# Patient Record
Sex: FEMALE | Race: WHITE | URBAN - METROPOLITAN AREA
[De-identification: names, ages, dates, MRNs, and addresses within clinical notes are randomized per-mention and may not be internally consistent; named-entity substitution may affect disease eponyms.]

---

## 2019-07-17 PROCEDURE — 86850 RBC ANTIBODY SCREEN: CPT | Performed by: OBSTETRICS & GYNECOLOGY

## 2019-07-17 PROCEDURE — 87624 HPV HI-RISK TYP POOLED RSLT: CPT | Performed by: OBSTETRICS & GYNECOLOGY

## 2019-07-17 PROCEDURE — 87389 HIV-1 AG W/HIV-1&-2 AB AG IA: CPT | Performed by: OBSTETRICS & GYNECOLOGY

## 2019-07-17 PROCEDURE — 87086 URINE CULTURE/COLONY COUNT: CPT | Performed by: OBSTETRICS & GYNECOLOGY

## 2019-07-17 PROCEDURE — 86901 BLOOD TYPING SEROLOGIC RH(D): CPT | Performed by: OBSTETRICS & GYNECOLOGY

## 2019-07-17 PROCEDURE — 86900 BLOOD TYPING SEROLOGIC ABO: CPT | Performed by: OBSTETRICS & GYNECOLOGY

## 2019-07-17 PROCEDURE — 88175 CYTOPATH C/V AUTO FLUID REDO: CPT | Performed by: OBSTETRICS & GYNECOLOGY

## 2019-07-24 PROCEDURE — 83021 HEMOGLOBIN CHROMOTOGRAPHY: CPT | Performed by: OBSTETRICS & GYNECOLOGY

## 2019-07-31 PROBLEM — O88.211 PULMONARY EMBOLISM AFFECTING PREGNANCY IN FIRST TRIMESTER: Status: ACTIVE | Noted: 2019-07-31

## 2019-07-31 PROBLEM — O88.211: Status: ACTIVE | Noted: 2019-07-31

## 2019-08-11 NOTE — PROGRESS NOTES
Outpatient Maternal-Fetal Medicine Consultation    Dear Dr. Tiffanie Lopez    Thank you for requesting a first trimester ultrasound and MFM consultation on your patient Mukesh Hyatt.   As you are aware she is a 33year old female  with a singletonpregnan patient. DISCUSSION  During her visit we discussed and reviewed the following issues:  FIRST TRIMESTER SCREENING:      The testing process was reviewed with the patient.   This screening test utilizes maternal age, nuchal translucency, ANUJA-A, and free b Trimester Screening: normal NT  · Bilateral PE's In Pregnancy - seen by hematology, on enoxaparin    RECOMMENDATIONS:  · Continue care with Dr. Jass Burroughs   · Continue comanagement with hematology  · Continue enoxaparin per hematology (70 mg SQ q 12 hours)  ·

## 2019-08-12 ENCOUNTER — HOSPITAL ENCOUNTER (OUTPATIENT)
Dept: PERINATAL CARE | Facility: HOSPITAL | Age: 29
Discharge: HOME | End: 2019-08-12
Attending: OBSTETRICS & GYNECOLOGY
Payer: COMMERCIAL

## 2019-08-12 VITALS
WEIGHT: 154 LBS | HEART RATE: 103 BPM | SYSTOLIC BLOOD PRESSURE: 140 MMHG | DIASTOLIC BLOOD PRESSURE: 85 MMHG | HEIGHT: 64 IN | BODY MASS INDEX: 26.29 KG/M2

## 2019-08-12 DIAGNOSIS — O88.211 PULMONARY EMBOLISM AFFECTING PREGNANCY IN FIRST TRIMESTER: ICD-10-CM

## 2019-08-12 DIAGNOSIS — Z36.9 FIRST TRIMESTER SCREENING: ICD-10-CM

## 2019-08-12 DIAGNOSIS — Z36.9 FIRST TRIMESTER SCREENING: Primary | ICD-10-CM

## 2019-08-12 PROCEDURE — 99242 OFF/OP CONSLTJ NEW/EST SF 20: CPT | Performed by: OBSTETRICS & GYNECOLOGY

## 2019-08-12 PROCEDURE — 76813 OB US NUCHAL MEAS 1 GEST: CPT | Performed by: OBSTETRICS & GYNECOLOGY

## 2019-08-12 RX ORDER — ENOXAPARIN SODIUM 150 MG/ML
70 INJECTION SUBCUTANEOUS EVERY 12 HOURS
COMMUNITY
End: 2019-08-13

## 2019-08-12 NOTE — PROGRESS NOTES
HPI:    Patient ID: Beau Sanchez is a 33year old female. HPI    Review of Systems         Current Outpatient Medications:  Enoxaparin Sodium 150 MG/ML Subcutaneous Solution Inject 70 mg into the skin every 12 (twelve) hours.  Disp:  Rfl:    Ferrous S

## 2019-08-13 PROBLEM — J30.9 ALLERGIC RHINITIS, UNSPECIFIED SEASONALITY, UNSPECIFIED TRIGGER: Status: ACTIVE | Noted: 2019-08-13

## 2019-08-13 PROBLEM — D50.9 IRON DEFICIENCY ANEMIA: Status: ACTIVE | Noted: 2019-01-01

## 2019-08-13 PROCEDURE — 86480 TB TEST CELL IMMUN MEASURE: CPT | Performed by: INTERNAL MEDICINE

## 2019-08-15 ENCOUNTER — TELEPHONE (OUTPATIENT)
Dept: PERINATAL CARE | Facility: HOSPITAL | Age: 29
End: 2019-08-15

## 2019-08-15 NOTE — TELEPHONE ENCOUNTER
Recd FTS results and Dr Nino Escobedo reviewed and signed off  Left message for Aden Holstein to inform her that the risk after screening  for Trisomy 13,18 and 21 is  1 in > 10,000 . These results are  within range  Pt left number to call back with any questions. Stepan Batres

## 2019-09-19 PROCEDURE — 82105 ALPHA-FETOPROTEIN SERUM: CPT | Performed by: OBSTETRICS & GYNECOLOGY

## 2019-09-19 PROCEDURE — 36415 COLL VENOUS BLD VENIPUNCTURE: CPT | Performed by: OBSTETRICS & GYNECOLOGY

## 2019-10-04 ENCOUNTER — TELEPHONE (OUTPATIENT)
Dept: PERINATAL CARE | Facility: HOSPITAL | Age: 29
End: 2019-10-04

## 2019-10-13 NOTE — PROGRESS NOTES
Gregg Kennedy    Dear Dr. Lambert Heads    Thank you for requesting an ultrasound and maternal-fetal medicine consultation on your patient Bradley Limon.   As you are aware she is a 26year old female  with a garibay preg 21w1d  · Bilateral PE's In Pregnancy - seen by hematology, on enoxaparin     RECOMMENDATIONS:  · Continue care with Dr. Gretchen Kendall ultrasound at 32 weeks.   · Continue comanagement with hematology  · Continue enoxaparin per hematology (70 mg

## 2019-10-14 ENCOUNTER — HOSPITAL ENCOUNTER (OUTPATIENT)
Dept: PERINATAL CARE | Facility: HOSPITAL | Age: 29
Discharge: HOME | End: 2019-10-14
Attending: OBSTETRICS & GYNECOLOGY
Payer: COMMERCIAL

## 2019-10-14 VITALS
SYSTOLIC BLOOD PRESSURE: 132 MMHG | HEART RATE: 97 BPM | BODY MASS INDEX: 28 KG/M2 | WEIGHT: 165 LBS | DIASTOLIC BLOOD PRESSURE: 71 MMHG

## 2019-10-14 DIAGNOSIS — O88.211 PULMONARY EMBOLISM AFFECTING PREGNANCY IN FIRST TRIMESTER: ICD-10-CM

## 2019-10-14 DIAGNOSIS — O88.211 PULMONARY EMBOLISM AFFECTING PREGNANCY IN FIRST TRIMESTER: Primary | ICD-10-CM

## 2019-10-14 PROCEDURE — 76811 OB US DETAILED SNGL FETUS: CPT | Performed by: OBSTETRICS & GYNECOLOGY

## 2019-10-14 PROCEDURE — 99215 OFFICE O/P EST HI 40 MIN: CPT | Performed by: OBSTETRICS & GYNECOLOGY

## 2019-10-15 ENCOUNTER — TELEPHONE (OUTPATIENT)
Dept: PERINATAL CARE | Facility: HOSPITAL | Age: 29
End: 2019-10-15

## 2019-11-25 PROBLEM — D56.3 ALPHA THALASSEMIA SILENT CARRIER: Status: ACTIVE | Noted: 2019-01-01

## 2019-12-29 NOTE — PROGRESS NOTES
Bradley Wyman  Dear Dr. Lorena Crook     Thank you for requesting an ultrasound and maternal-fetal medicine consultation on your patient Anitra Arteaga.   As you are aware she is a 26year old female  with a garibay pr about potential side effects of heparin therapy such as osteopenia and heparin induced thrombocytopenia (HIT).       IMPRESSION:  · IUP at 32w1d  · Bilateral PE's In Pregnancy - seen by hematology, on enoxaparin     RECOMMENDATIONS:  · Continue care with

## 2019-12-30 ENCOUNTER — HOSPITAL ENCOUNTER (OUTPATIENT)
Dept: PERINATAL CARE | Facility: HOSPITAL | Age: 29
Discharge: HOME | End: 2019-12-30
Attending: OBSTETRICS & GYNECOLOGY
Payer: COMMERCIAL

## 2019-12-30 VITALS
WEIGHT: 184 LBS | SYSTOLIC BLOOD PRESSURE: 129 MMHG | BODY MASS INDEX: 31.41 KG/M2 | DIASTOLIC BLOOD PRESSURE: 82 MMHG | HEIGHT: 64 IN | HEART RATE: 114 BPM

## 2019-12-30 DIAGNOSIS — O88.211 PULMONARY EMBOLISM AFFECTING PREGNANCY IN FIRST TRIMESTER: Primary | ICD-10-CM

## 2019-12-30 DIAGNOSIS — O88.211 PULMONARY EMBOLISM AFFECTING PREGNANCY IN FIRST TRIMESTER: ICD-10-CM

## 2019-12-30 PROCEDURE — 99213 OFFICE O/P EST LOW 20 MIN: CPT | Performed by: OBSTETRICS & GYNECOLOGY

## 2019-12-30 PROCEDURE — 76816 OB US FOLLOW-UP PER FETUS: CPT | Performed by: OBSTETRICS & GYNECOLOGY

## 2019-12-30 PROCEDURE — 76819 FETAL BIOPHYS PROFIL W/O NST: CPT | Performed by: OBSTETRICS & GYNECOLOGY

## 2020-02-10 PROBLEM — D56.3 ALPHA THALASSEMIA SILENT CARRIER: Status: RESOLVED | Noted: 2019-01-01 | Resolved: 2020-02-05

## 2020-11-20 PROBLEM — D50.9 IRON DEFICIENCY ANEMIA, UNSPECIFIED IRON DEFICIENCY ANEMIA TYPE: Status: ACTIVE | Noted: 2020-11-20

## 2020-11-20 PROBLEM — D68.51 HETEROZYGOUS FACTOR V LEIDEN MUTATION (HCC): Status: ACTIVE | Noted: 2020-11-20

## 2021-07-24 PROBLEM — N94.6 DYSMENORRHEA: Status: ACTIVE | Noted: 2021-07-24

## 2021-07-24 PROBLEM — Z00.00 WELLNESS EXAMINATION: Status: ACTIVE | Noted: 2021-07-24

## 2021-07-24 PROBLEM — M25.462 PAIN AND SWELLING OF LEFT KNEE: Status: ACTIVE | Noted: 2021-07-24

## 2021-07-24 PROBLEM — Z80.3 FAMILY HISTORY OF BREAST CANCER IN MOTHER: Status: ACTIVE | Noted: 2021-07-24

## 2021-07-24 PROBLEM — M54.2 NECK PAIN: Status: ACTIVE | Noted: 2021-07-24

## 2021-07-24 PROBLEM — M25.562 PAIN AND SWELLING OF LEFT KNEE: Status: ACTIVE | Noted: 2021-07-24

## 2021-07-24 PROBLEM — D56.0 ALPHA-THALASSEMIA (HCC): Status: ACTIVE | Noted: 2021-07-24

## 2024-01-17 LAB
AMB EXT TREPONEMAL ANTIBODIES: NEGATIVE
ANTIBODY SCREEN OB: NEGATIVE
HEPATITIS B SURFACE ANTIGEN OB: NEGATIVE
HIV RESULT OB: NEGATIVE

## 2024-04-30 LAB
AMB EXT TREPONEMAL ANTIBODIES: NEGATIVE
HIV RESULT OB: NEGATIVE

## 2024-06-28 LAB — STREP GP B CULT OB: NEGATIVE

## 2024-07-11 ENCOUNTER — TELEPHONE (OUTPATIENT)
Dept: OBGYN UNIT | Facility: HOSPITAL | Age: 34
End: 2024-07-11

## 2024-07-16 ENCOUNTER — ANESTHESIA EVENT (OUTPATIENT)
Dept: OBGYN UNIT | Facility: HOSPITAL | Age: 34
End: 2024-07-16
Payer: COMMERCIAL

## 2024-07-16 ENCOUNTER — HOSPITAL ENCOUNTER (INPATIENT)
Facility: HOSPITAL | Age: 34
LOS: 1 days | Discharge: HOME | End: 2024-07-17
Attending: OBSTETRICS & GYNECOLOGY | Admitting: OBSTETRICS & GYNECOLOGY
Payer: COMMERCIAL

## 2024-07-16 ENCOUNTER — ANESTHESIA (OUTPATIENT)
Dept: OBGYN UNIT | Facility: HOSPITAL | Age: 34
End: 2024-07-16
Payer: COMMERCIAL

## 2024-07-16 ENCOUNTER — APPOINTMENT (OUTPATIENT)
Dept: OBGYN CLINIC | Facility: HOSPITAL | Age: 34
End: 2024-07-16
Attending: OBSTETRICS & GYNECOLOGY
Payer: COMMERCIAL

## 2024-07-16 PROBLEM — Z34.90 PREGNANCY (HCC): Status: ACTIVE | Noted: 2024-07-16

## 2024-07-16 LAB
ANTIBODY SCREEN: NEGATIVE
BASOPHILS # BLD AUTO: 0.08 X10(3) UL (ref 0–0.2)
BASOPHILS NFR BLD AUTO: 0.8 %
DEPRECATED RDW RBC AUTO: 34.9 FL (ref 35.1–46.3)
EOSINOPHIL # BLD AUTO: 0.16 X10(3) UL (ref 0–0.7)
EOSINOPHIL NFR BLD AUTO: 1.6 %
ERYTHROCYTE [DISTWIDTH] IN BLOOD BY AUTOMATED COUNT: 15.2 % (ref 11–15)
HCT VFR BLD AUTO: 37.3 %
HGB BLD-MCNC: 12 G/DL
IMM GRANULOCYTES # BLD AUTO: 0.25 X10(3) UL (ref 0–1)
IMM GRANULOCYTES NFR BLD: 2.5 %
LYMPHOCYTES # BLD AUTO: 2.82 X10(3) UL (ref 1–4)
LYMPHOCYTES NFR BLD AUTO: 28.3 %
MCH RBC QN AUTO: 21.5 PG (ref 26–34)
MCHC RBC AUTO-ENTMCNC: 32.2 G/DL (ref 31–37)
MCV RBC AUTO: 66.8 FL
MONOCYTES # BLD AUTO: 0.8 X10(3) UL (ref 0.1–1)
MONOCYTES NFR BLD AUTO: 8 %
NEUTROPHILS # BLD AUTO: 5.86 X10 (3) UL (ref 1.5–7.7)
NEUTROPHILS # BLD AUTO: 5.86 X10(3) UL (ref 1.5–7.7)
NEUTROPHILS NFR BLD AUTO: 58.8 %
PLATELET # BLD AUTO: 159 10(3)UL (ref 150–450)
PLATELETS.RETICULATED NFR BLD AUTO: 7.1 % (ref 0–7)
RBC # BLD AUTO: 5.58 X10(6)UL
RH BLOOD TYPE: POSITIVE
WBC # BLD AUTO: 10 X10(3) UL (ref 4–11)

## 2024-07-16 PROCEDURE — 86850 RBC ANTIBODY SCREEN: CPT | Performed by: OBSTETRICS & GYNECOLOGY

## 2024-07-16 PROCEDURE — 85025 COMPLETE CBC W/AUTO DIFF WBC: CPT | Performed by: OBSTETRICS & GYNECOLOGY

## 2024-07-16 PROCEDURE — 3E033VJ INTRODUCTION OF OTHER HORMONE INTO PERIPHERAL VEIN, PERCUTANEOUS APPROACH: ICD-10-PCS | Performed by: OBSTETRICS & GYNECOLOGY

## 2024-07-16 PROCEDURE — 86900 BLOOD TYPING SEROLOGIC ABO: CPT | Performed by: OBSTETRICS & GYNECOLOGY

## 2024-07-16 PROCEDURE — 86901 BLOOD TYPING SEROLOGIC RH(D): CPT | Performed by: OBSTETRICS & GYNECOLOGY

## 2024-07-16 PROCEDURE — 85060 BLOOD SMEAR INTERPRETATION: CPT | Performed by: OBSTETRICS & GYNECOLOGY

## 2024-07-16 PROCEDURE — 86780 TREPONEMA PALLIDUM: CPT | Performed by: OBSTETRICS & GYNECOLOGY

## 2024-07-16 PROCEDURE — 0KQM0ZZ REPAIR PERINEUM MUSCLE, OPEN APPROACH: ICD-10-PCS | Performed by: OBSTETRICS & GYNECOLOGY

## 2024-07-16 RX ORDER — CHOLECALCIFEROL (VITAMIN D3) 25 MCG
1 TABLET,CHEWABLE ORAL DAILY
Status: DISCONTINUED | OUTPATIENT
Start: 2024-07-16 | End: 2024-07-17

## 2024-07-16 RX ORDER — ENOXAPARIN SODIUM 100 MG/ML
40 INJECTION SUBCUTANEOUS DAILY
Status: DISCONTINUED | OUTPATIENT
Start: 2024-07-17 | End: 2024-07-17

## 2024-07-16 RX ORDER — FERROUS SULFATE 325(65) MG
325 TABLET, DELAYED RELEASE (ENTERIC COATED) ORAL
COMMUNITY

## 2024-07-16 RX ORDER — ACETAMINOPHEN 500 MG
1000 TABLET ORAL EVERY 6 HOURS PRN
Status: DISCONTINUED | OUTPATIENT
Start: 2024-07-16 | End: 2024-07-17

## 2024-07-16 RX ORDER — SIMETHICONE 80 MG
80 TABLET,CHEWABLE ORAL 3 TIMES DAILY PRN
Status: DISCONTINUED | OUTPATIENT
Start: 2024-07-16 | End: 2024-07-17

## 2024-07-16 RX ORDER — LIDOCAINE HCL/EPINEPHRINE/PF 2%-1:200K
VIAL (ML) INJECTION
Status: DISCONTINUED
Start: 2024-07-16 | End: 2024-07-16 | Stop reason: WASHOUT

## 2024-07-16 RX ORDER — BENZOCAINE/MENTHOL 6 MG-10 MG
1 LOZENGE MUCOUS MEMBRANE EVERY 6 HOURS PRN
Status: DISCONTINUED | OUTPATIENT
Start: 2024-07-16 | End: 2024-07-17

## 2024-07-16 RX ORDER — LIDOCAINE HYDROCHLORIDE 10 MG/ML
30 INJECTION, SOLUTION EPIDURAL; INFILTRATION; INTRACAUDAL; PERINEURAL ONCE
Status: DISCONTINUED | OUTPATIENT
Start: 2024-07-16 | End: 2024-07-16 | Stop reason: HOSPADM

## 2024-07-16 RX ORDER — ONDANSETRON 2 MG/ML
4 INJECTION INTRAMUSCULAR; INTRAVENOUS EVERY 6 HOURS PRN
Status: DISCONTINUED | OUTPATIENT
Start: 2024-07-16 | End: 2024-07-17

## 2024-07-16 RX ORDER — DOCUSATE SODIUM 100 MG/1
100 CAPSULE, LIQUID FILLED ORAL
Status: DISCONTINUED | OUTPATIENT
Start: 2024-07-16 | End: 2024-07-17

## 2024-07-16 RX ORDER — ZOLPIDEM TARTRATE 5 MG/1
5 TABLET ORAL NIGHTLY PRN
Status: DISCONTINUED | OUTPATIENT
Start: 2024-07-16 | End: 2024-07-16 | Stop reason: HOSPADM

## 2024-07-16 RX ORDER — CITRIC ACID/SODIUM CITRATE 334-500MG
30 SOLUTION, ORAL ORAL AS NEEDED
Status: DISCONTINUED | OUTPATIENT
Start: 2024-07-16 | End: 2024-07-16 | Stop reason: HOSPADM

## 2024-07-16 RX ORDER — IBUPROFEN 600 MG/1
600 TABLET ORAL ONCE AS NEEDED
Status: DISCONTINUED | OUTPATIENT
Start: 2024-07-16 | End: 2024-07-16 | Stop reason: HOSPADM

## 2024-07-16 RX ORDER — ONDANSETRON 2 MG/ML
4 INJECTION INTRAMUSCULAR; INTRAVENOUS EVERY 6 HOURS PRN
Status: DISCONTINUED | OUTPATIENT
Start: 2024-07-16 | End: 2024-07-16 | Stop reason: HOSPADM

## 2024-07-16 RX ORDER — SODIUM CHLORIDE, SODIUM LACTATE, POTASSIUM CHLORIDE, CALCIUM CHLORIDE 600; 310; 30; 20 MG/100ML; MG/100ML; MG/100ML; MG/100ML
INJECTION, SOLUTION INTRAVENOUS AS NEEDED
Status: DISCONTINUED | OUTPATIENT
Start: 2024-07-16 | End: 2024-07-16 | Stop reason: HOSPADM

## 2024-07-16 RX ORDER — ACETAMINOPHEN 500 MG
1000 TABLET ORAL EVERY 6 HOURS PRN
Status: DISCONTINUED | OUTPATIENT
Start: 2024-07-16 | End: 2024-07-16 | Stop reason: HOSPADM

## 2024-07-16 RX ORDER — ENOXAPARIN SODIUM 100 MG/ML
40 INJECTION SUBCUTANEOUS DAILY
Status: CANCELLED | OUTPATIENT
Start: 2024-07-16

## 2024-07-16 RX ORDER — NALBUPHINE HYDROCHLORIDE 10 MG/ML
10 INJECTION, SOLUTION INTRAMUSCULAR; INTRAVENOUS; SUBCUTANEOUS EVERY 4 HOURS PRN
Status: DISCONTINUED | OUTPATIENT
Start: 2024-07-16 | End: 2024-07-16 | Stop reason: HOSPADM

## 2024-07-16 RX ORDER — DEXTROSE, SODIUM CHLORIDE, SODIUM LACTATE, POTASSIUM CHLORIDE, AND CALCIUM CHLORIDE 5; .6; .31; .03; .02 G/100ML; G/100ML; G/100ML; G/100ML; G/100ML
INJECTION, SOLUTION INTRAVENOUS CONTINUOUS
Status: DISCONTINUED | OUTPATIENT
Start: 2024-07-16 | End: 2024-07-16 | Stop reason: HOSPADM

## 2024-07-16 RX ORDER — BUPIVACAINE HYDROCHLORIDE 2.5 MG/ML
20 INJECTION, SOLUTION EPIDURAL; INFILTRATION; INTRACAUDAL ONCE
Status: DISCONTINUED | OUTPATIENT
Start: 2024-07-16 | End: 2024-07-16 | Stop reason: HOSPADM

## 2024-07-16 RX ORDER — BISACODYL 10 MG
10 SUPPOSITORY, RECTAL RECTAL ONCE AS NEEDED
Status: DISCONTINUED | OUTPATIENT
Start: 2024-07-16 | End: 2024-07-17

## 2024-07-16 RX ORDER — HYDROXYZINE HYDROCHLORIDE 50 MG/ML
50 INJECTION, SOLUTION INTRAMUSCULAR EVERY 4 HOURS PRN
Status: DISCONTINUED | OUTPATIENT
Start: 2024-07-16 | End: 2024-07-16 | Stop reason: HOSPADM

## 2024-07-16 RX ORDER — TERBUTALINE SULFATE 1 MG/ML
0.25 INJECTION, SOLUTION SUBCUTANEOUS AS NEEDED
Status: DISCONTINUED | OUTPATIENT
Start: 2024-07-16 | End: 2024-07-16 | Stop reason: HOSPADM

## 2024-07-16 RX ORDER — HEPARIN SODIUM 5000 [USP'U]/ML
5000 INJECTION, SOLUTION INTRAVENOUS; SUBCUTANEOUS EVERY 8 HOURS SCHEDULED
COMMUNITY
End: 2024-07-17

## 2024-07-16 RX ORDER — AMMONIA INHALANTS 0.04 G/.3ML
0.3 INHALANT RESPIRATORY (INHALATION) AS NEEDED
Status: DISCONTINUED | OUTPATIENT
Start: 2024-07-16 | End: 2024-07-17

## 2024-07-16 RX ORDER — BUPIVACAINE HYDROCHLORIDE 2.5 MG/ML
INJECTION, SOLUTION EPIDURAL; INFILTRATION; INTRACAUDAL
Status: COMPLETED | OUTPATIENT
Start: 2024-07-16 | End: 2024-07-16

## 2024-07-16 RX ORDER — ACETAMINOPHEN 500 MG
500 TABLET ORAL EVERY 6 HOURS PRN
Status: DISCONTINUED | OUTPATIENT
Start: 2024-07-16 | End: 2024-07-16 | Stop reason: HOSPADM

## 2024-07-16 RX ORDER — LIDOCAINE HYDROCHLORIDE AND EPINEPHRINE 15; 5 MG/ML; UG/ML
INJECTION, SOLUTION EPIDURAL
Status: COMPLETED | OUTPATIENT
Start: 2024-07-16 | End: 2024-07-16

## 2024-07-16 RX ORDER — ENOXAPARIN SODIUM 100 MG/ML
40 INJECTION SUBCUTANEOUS DAILY
Status: DISCONTINUED | OUTPATIENT
Start: 2024-07-17 | End: 2024-07-16

## 2024-07-16 RX ORDER — NALBUPHINE HYDROCHLORIDE 10 MG/ML
2.5 INJECTION, SOLUTION INTRAMUSCULAR; INTRAVENOUS; SUBCUTANEOUS
Status: DISCONTINUED | OUTPATIENT
Start: 2024-07-16 | End: 2024-07-17

## 2024-07-16 RX ORDER — CALCIUM CARBONATE 500 MG/1
1000 TABLET, CHEWABLE ORAL EVERY 4 HOURS PRN
Status: DISCONTINUED | OUTPATIENT
Start: 2024-07-16 | End: 2024-07-16 | Stop reason: HOSPADM

## 2024-07-16 RX ORDER — ACETAMINOPHEN 500 MG
500 TABLET ORAL EVERY 6 HOURS PRN
Status: DISCONTINUED | OUTPATIENT
Start: 2024-07-16 | End: 2024-07-17

## 2024-07-16 RX ORDER — BUPIVACAINE HCL/0.9 % NACL/PF 0.25 %
5 PLASTIC BAG, INJECTION (ML) EPIDURAL AS NEEDED
Status: DISCONTINUED | OUTPATIENT
Start: 2024-07-16 | End: 2024-07-17

## 2024-07-16 RX ADMIN — LIDOCAINE HYDROCHLORIDE AND EPINEPHRINE 5 ML: 15; 5 INJECTION, SOLUTION EPIDURAL at 11:30:00

## 2024-07-16 RX ADMIN — BUPIVACAINE HYDROCHLORIDE 10 ML: 2.5 INJECTION, SOLUTION EPIDURAL; INFILTRATION; INTRACAUDAL at 11:30:00

## 2024-07-16 NOTE — ANESTHESIA POSTPROCEDURE EVALUATION
Patient: Edelmira Richardson    Procedure Summary       Date: 07/16/24 Room / Location:     Anesthesia Start: 1129 Anesthesia Stop: 1359    Procedure: LABOR ANALGESIA Diagnosis:     Scheduled Providers:  Anesthesiologist: Aidee Garcia MD    Anesthesia Type: epidural ASA Status: 2            Anesthesia Type: epidural    Vitals Value Taken Time   /63 07/16/24 1446   Temp 98 07/16/24 1608   Pulse 91 07/16/24 1446   Resp 12 07/16/24 1608   SpO2 100 % 07/16/24 1415       [facility] AN Post Evaluation:   Patient Evaluated in PACU  Patient Participation: complete - patient participated  Level of Consciousness: awake  Pain Management: adequate  Airway Patency:patent  Dental exam unchanged from preop  Yes    Cardiovascular Status: acceptable  Respiratory Status: acceptable  Postoperative Hydration acceptable      AIDEE GARCIA MD  7/16/2024 4:08 PM

## 2024-07-16 NOTE — H&P
[facility]  part of [facility]    History & Physical    Edelmira Richardson Patient Status:  Inpatient    1990 MRN [id number]   Location [facility] B[facility] Attending Rocio Phillpis MD   Hosp Day # 0 PCP Luis Hardy MD     Date of Admission:  2024    SUBJECTIVE:    Edelmira Richardson is a 33 year old  female with 2024, by Last Menstrual Period   at 39w0d gestation who is being admitted for induction of labor.  Her current obstetrical history is significant for  heterozygous factor V leiden and hx pulmonary embolus .  Patient reports no complaints and good fetal movement .   Fetal Movement: normal.     Obstetric History:   OB History    Para Term  AB Living   2 1 1 0 0 1   SAB IAB Ectopic Multiple Live Births   0 0 0 0 1      # Outcome Date GA Lbr Ben/2nd Weight Sex Type Anes PTL Lv   2 Current            1 Term 20 37w3d   M NORMAL SPONT Local N HUNTER      Birth Comments: EBL 300ml     Past Medical History:   Past Medical History:    Allergic rhinitis    Alpha thalassemia silent carrier    worse w/ pregnancy    Iron deficiency anemia    worse w/ pregnancy    Pulmonary emboli (HCC)    Dx early in pregnancy     Past Social History: No past surgical history on file.  Family History:   Family History   Problem Relation Age of Onset    Diabetes Mother     Hypertension Mother     DVT/VTE Mother     Breast Cancer Mother 35        dx 35    Diabetes Father     Hypertension Father      Social History:   Social History     Tobacco Use    Smoking status: Never    Smokeless tobacco: Never   Substance Use Topics    Alcohol use: Never       Home Meds:   Medications Prior to Admission   Medication Sig Dispense Refill Last Dose    ferrous sulfate 325 (65 FE) MG Oral Tab EC Take 1 tablet (325 mg total) by mouth daily with breakfast.   Past Week    heparin 5000 UNIT/ML Injection Solution Inject 1 mL (5,000 Units total) into the skin every 8 (eight)  hours.       Prenatal Multivit-Min-Fe-FA (PRENATAL VITAMINS OR) Take 1 tablet by mouth daily.    7/16/2024    enoxaparin (LOVENOX) 40 MG/0.4ML Subcutaneous Solution Inject 0.4 mL (40 mg total) into the skin daily. 14 each 0 More than a month    Fluocinolone Acetonide (DERMOTIC) 0.01 % Otic Oil 1-2 drops both ears BID PRN itchiness 1 each 2      Allergies:   Allergies   Allergen Reactions    Sulfa Antibiotics ANAPHYLAXIS       OBJECTIVE:    Temp:  [97.9 °F (36.6 °C)] 97.9 °F (36.6 °C)  Pulse:  [92] 92  Resp:  [20] 20  BP: (131)/(87) 131/87    Lungs:   clear to auscultation bilaterally   Heart:   regular rate and rhythm, S1, S2 normal, no murmur, click, rub or gallop   Abdomen: FHT present   Fetal Surveillance:  150 BPM   Fetal heart variability: moderate  Fetal Heart Rate decelerations: none      Cervix: 3-4/50/-4     Lab Review:  A, Rh+, Rubella-immune, Hepatitis B surface antigen non-reactive, GBS negative  Lab Results   Component Value Date    WBC 10.0 07/16/2024    HGB 12.0 07/16/2024    HCT 37.3 07/16/2024    .0 07/16/2024          ASSESSMENT:    39w0d weeks gestation.  Not in labor.  Obstetrical history significant for  hx PE and factor V leiden .    PLAN:    Risks, benefits, alternatives and possible complications have been discussed in detail with the patient.  Pre-admission, admission, and post admission procedures and expectations were discussed in detail.  All questions answered, all appropriate consents will be signed at the Hospital. Admission is planned for today.   Anticipate vaginal delivery. and Augmentation: ARBOW and IV Pitocin induction..    Laney Mendieta MD  7/16/2024  7:16 AM

## 2024-07-16 NOTE — PLAN OF CARE
Problem: POSTPARTUM  Goal: Long Term Goal:Experiences normal postpartum course  Description: INTERVENTIONS:  - Assess and monitor vital signs and lab values.  - Assess fundus and lochia.  - Provide ice/sitz baths for perineum discomfort.  - Monitor healing of incision/episiotomy/laceration, and assess for signs and symptoms of infection and hematoma.  - Assess bladder function and monitor for bladder distention.  - Provide/instruct/assist with pericare as needed.  - Provide VTE prophylaxis as needed.  - Monitor bowel function.  - Encourage ambulation and provide assistance as needed.  - Assess and monitor emotional status and provide social service/psych resources as needed.  - Utilize standard precautions and use personal protective equipment as indicated. Ensure aseptic care of all intravenous lines and invasive tubes/drains.  - Obtain immunization and exposure to communicable diseases history.  Outcome: Progressing  Goal: Optimize infant feeding at the breast  Description: INTERVENTIONS:  - Initiate breast feeding within first hour after birth.   - Monitor effectiveness of current breast feeding efforts.  - Assess support systems available to mother/family.  - Identify cultural beliefs/practices regarding lactation, letdown techniques, maternal food preferences.  - Assess mother's knowledge and previous experience with breast feeding.  - Provide information as needed about early infant feeding cues (e.g., rooting, lip smacking, sucking fingers/hand) versus late cue of crying.  - Discuss/demonstrate breast feeding aids (e.g., infant sling, nursing footstool/pillows, and breast pumps).  - Encourage mother/other family members to express feelings/concerns, and actively listen.  - Educate father/SO about benefits of breast feeding and how to manage common lactation challenges.  - Recommend avoidance of specific medications or substances incompatible with breast feeding.  - Assess and monitor for signs of nipple  pain/trauma.  - Instruct and provide assistance with proper latch.  - Review techniques for milk expression (breast pumping) and storage of breast milk. Provide pumping equipment/supplies, instructions and assistance, as needed.  - Encourage rooming-in and breast feeding on demand.  - Encourage skin-to-skin contact.  - Provide LC support as needed.  - Assess for and manage engorgement.  - Provide breast feeding education handouts and information on community breast feeding support.   Outcome: Progressing  Goal: Establishment of adequate milk supply with medication/procedure interruptions  Description: INTERVENTIONS:  - Review techniques for milk expression (breast pumping).   - Provide pumping equipment/supplies, instructions, and assistance until it is safe to breastfeed infant.  Outcome: Progressing  Goal: Experiences normal breast weaning course  Description: INTERVENTIONS:  - Assess for and manage engorgement.  - Instruct on breast care.  - Provide comfort measures.  Outcome: Progressing  Goal: Appropriate maternal -  bonding  Description: INTERVENTIONS:  - Assess caregiver- interactions.  - Assess caregiver's emotional status and coping mechanisms.  - Encourage caregiver to participate in  daily care.  - Assess support systems available to mother/family.  - Provide /case management support as needed.  Outcome: Progressing     Problem: Patient/Family Goals  Goal: Patient/Family Long Term Goal  Description: Patient's Long Term Goal: Uncomplicated delivery    Interventions:  - Induction/augmentation per protocol  - education  - involve pt in plan of care  - monitoring labor progress and fhr per protocol  - frequent position changes  - See additional Care Plan goals for specific interventions    Outcome: Progressing  Goal: Patient/Family Short Term Goal  Description: Patient's Short Term Goal: comfort and pain control      Interventions:   - epidural per pt request and provider  order  -iv/IM per pt request and provider order  -education  - involve pt in plan of care  -position for comfort  -nonpharmacologic intervention   - See additional Care Plan goals for specific interventions     Outcome: Progressing     Problem: Patient Centered Care  Goal: Patient preferences are identified and integrated in the patient's plan of care  Description: Interventions:  - What would you like us to know as we care for you? \"It's a girl!\"  - Provide timely, complete, and accurate information to patient/family  - Incorporate patient and family knowledge, values, beliefs, and cultural backgrounds into the planning and delivery of care  - Encourage patient/family to participate in care and decision-making at the level they choose  - Honor patient and family perspectives and choices  Outcome: Progressing     Problem: GENITOURINARY - ADULT  Goal: Absence of urinary retention  Description: INTERVENTIONS:  - Assess patient’s ability to void and empty bladder  - Monitor intake/output and perform bladder scan as needed  - Follow urinary retention protocol/standard of care  - Consider collaborating with pharmacy to review patient's medication profile  - Implement strategies to promote bladder emptying  Outcome: Progressing

## 2024-07-16 NOTE — L&D DELIVERY NOTE
Debra, Girl [[id number]]      Labor Events     labor?: No   steroids?: None  Antibiotics received during labor?: No  Rupture date/time: 2024 1030     Rupture type: SROM  Fluid color: Clear  Labor type: Induced Onset of Labor  Induction: Oxytocin  Indications for induction: Other - comment  Induction comment: History of PE  Intrapartum & labor complications: None       Labor Length    1st stage: 3h 04m  2nd stage: 0h 21m  3rd stage: 0h 03m       Labor Event Times    Labor onset date/time: 2024 1030  Dilation complete date/time: 2024 1334  Start pushing date/time: 2024 1351        Presentation    Presentation: Vertex       Operative Delivery    Operative Vaginal Delivery: No                Shoulder Dystocia    Shoulder Dystocia: No       Anesthesia    Method: Epidural              [place] Delivery      Head delivery date/time: 2024 13:55:45   Delivery date/time:  24 13:55:53   Delivery type: Normal spontaneous vaginal delivery    Details:     Delivery location: delivery room  Delivery Room Temperature: 72       Delivery Providers    Delivering Clinician: Laney Mendieta MD   Delivery personnel:  Provider Role   Jody Jasmine Baby Nurse   Karolyn Kemp, RN Delivery Nurse   Ana Maria Foster Surgical Tech             Cord    Vessels: 3 Vessels  Complications: None  # of loops: 0  Timed cord clamping: Yes  Time in sec: 45  Cord blood disposition: to lab  Gases sent?: No       Resuscitation    Method: None        Measurements      Weight: 3480 g 7 lb 10.8 oz Length: 55.9 cm     Head circum.: 35 cm              Placenta    Date/time: 2024 1359  Removal: Spontaneous  Appearance: Intact  Disposition: Discarded       Apgars    Living status: Living   Apgar Scoring Key:    0 1 2    Skin color Blue or pale Acrocyanotic Completely pink    Heart rate Absent <100 bpm >100 bpm    Reflex irritability No response Grimace Cry or active withdrawal    Muscle tone  Limp Some flexion Active motion    Respiratory effort Absent Weak cry; hypoventilation Good, crying              1 Minute:  5 Minute:  10 Minute:  15 Minute:  20 Minute:      Skin color: 1  1       Heart rate: 2  2       Reflex irritablity: 2  2       Muscle tone: 2  2       Respiratory effort: 2  2       Total: 9  9          Apgars assigned by: SHERMAN ANDERSON  [place] disposition: with mother       Skin to Skin    Skin to skin initiated date/time: 2024 1355  Skin to skin with: Mother       Vaginal Count    Initial count RN: Karolyn Kemp RN  Initial count Tech: Ruthy Haynes   Sponges   Sharps    Initial counts 10   0    Final counts 10   2    Final count RN: Karolyn Kemp RN  Final count MD: Laney Mendieta MD       Lacerations    Episiotomy: None  Perineal lacerations: 2nd Repaired?: Yes     Vaginal laceration?: No      Cervical laceration?: No    Clitoral laceration?: No    Quantitative blood loss (mL): 462            [facility]  part of [facility]    Vaginal Delivery Note    Edlemira Richardson Patient Status:  Inpatient    1990 MRN [id number]   Location [facility] 3SE Attending Rocio Phillips MD   Hosp Day # 0 PCP Luis Hardy MD     Delivery     Infant  Date of Delivery: 2024    Time of Delivery: 1:55 PM   Delivery Type: Normal spontaneous vaginal delivery     Infant Sex/Birthweight: female 7 lb 10.8 oz (3.48 kg)     Presentation Vertex [1]   Position          Apgars:  1 minute: 9                5 minutes: 9                         10 minutes:      Placenta  Date/Time of Delivery: 2024  1:59 PM    Delivery: spontaneous  Placenta to Pathology: no  Cord Gases Submitted: no  Cord Blood Collection: no  Cord Tissue Collection: no  Cord Complications: none  Sponge and Needle Counts:  Verified yes    Maternal Anesthesia: epidural   Episiotomy/Laceration Repair  Episiotomy: none  Laceration: perineal 2 degree  Location: midline  Suture Size/Type: 2-0  Chromic and 3-0 Chromic    Delivery Complications  none    Neonatologist Present: no  Delivery Comment: Patient complete and pushing in LDR with epidural.  Delivered OA head over intact perineum.  No nuchal cord.  Body delivered and placed on maternal abdomen.  Cord clamped x2 and cut by father.   of intact 3v cord and placenta.  Laceration repaired in routine manner.  Cervix and rectum intact.        Intake/Output   Quantitative Blood Loss (mL) 462       Laney Mendieta MD   2024  4:13 PM

## 2024-07-16 NOTE — ANESTHESIA PREPROCEDURE EVALUATION
Anesthesia PreOp Note    HPI:     Edelmira Richardson is a 33 year old female who presents for preoperative consultation requested by: * No surgeons listed *    Date of Surgery: 7/16/2024    * No procedures listed *  Indication: * No pre-op diagnosis entered *    Relevant Problems   No relevant active problems       NPO:                         History Review:  Patient Active Problem List    Diagnosis Date Noted    Pregnancy (HCC) 07/16/2024    Family history of breast cancer in mother 07/24/2021    Neck pain 07/24/2021    Wellness examination 07/24/2021    Alpha-thalassemia (HCC) 07/24/2021    Pain and swelling of left knee 07/24/2021    Dysmenorrhea 07/24/2021    Iron deficiency anemia, unspecified iron deficiency anemia type 11/20/2020    Heterozygous factor V Leiden mutation ([facility]) 11/20/2020    Allergic rhinitis, unspecified seasonality, unspecified trigger 08/13/2019    Pulmonary embolism affecting pregnancy in first trimester ([facility]) 07/31/2019       Past Medical History:    Allergic rhinitis    Alpha thalassemia silent carrier    worse w/ pregnancy    Iron deficiency anemia    worse w/ pregnancy    Pulmonary emboli ([facility])    Dx early in pregnancy       No past surgical history on file.    Medications Prior to Admission   Medication Sig Dispense Refill Last Dose    ferrous sulfate 325 (65 FE) MG Oral Tab EC Take 1 tablet (325 mg total) by mouth daily with breakfast.   Past Week    heparin 5000 UNIT/ML Injection Solution Inject 1 mL (5,000 Units total) into the skin every 8 (eight) hours.       Prenatal Multivit-Min-Fe-FA (PRENATAL VITAMINS OR) Take 1 tablet by mouth daily.    7/16/2024    enoxaparin (LOVENOX) 40 MG/0.4ML Subcutaneous Solution Inject 0.4 mL (40 mg total) into the skin daily. 14 each 0 More than a month    Fluocinolone Acetonide (DERMOTIC) 0.01 % Otic Oil 1-2 drops both ears BID PRN itchiness 1 each 2      Current Facility-Administered Medications Ordered in Epic   Medication Dose Route Frequency  Provider Last Rate Last Admin    dextrose in lactated ringers 5% infusion   Intravenous Continuous Rocio Phillips MD        lactated ringers infusion   Intravenous PRN Rocio Phillips  mL/hr at 07/16/24 0631 New Bag at 07/16/24 0631    lactated ringers IV bolus 500 mL  500 mL Intravenous PRN Rocio Phillips MD        acetaminophen (Tylenol Extra Strength) tab 500 mg  500 mg Oral Q6H PRN Rocio Phillips MD        acetaminophen (Tylenol Extra Strength) tab 1,000 mg  1,000 mg Oral Q6H PRN Rocio Phillips MD        ibuprofen (Motrin) tab 600 mg  600 mg Oral Once PRN Rocio Phillips MD        ondansetron (Zofran) 4 MG/2ML injection 4 mg  4 mg Intravenous Q6H PRN Rocio Phillips MD        oxyTOCIN in sodium chloride 0.9% (Pitocin) 30 Units/500mL infusion premix  62.5-900 rhona-units/min Intravenous Continuous Rocio Phillips MD        terbutaline (Brethine) 1 MG/ML injection 0.25 mg  0.25 mg Subcutaneous PRN Rocio Phillips MD        sodium citrate-citric acid (Bicitra) 500-334 MG/5ML oral solution 30 mL  30 mL Oral PRN Rocio Phillips MD        lidocaine PF (Xylocaine-MPF) 1% injection  30 mL Intradermal Once Rocio Phillips MD        fentaNYL (Sublimaze) 50 mcg/mL injection 100 mcg  100 mcg Intravenous Once Rocio Phillips MD        fentaNYL (Sublimaze) 50 mcg/mL injection 50 mcg  50 mcg Intravenous Q30 Min PRN Rocio Phillips MD        nalbuphine (Nubain) 10 mg/mL injection 10 mg  10 mg Intramuscular Q4H PRN Rocio Phillips MD        And    hydrOXYzine 50 mg/mL injection 50 mg  50 mg Intramuscular Q4H PRN Rocio Phillips MD        calcium carbonate (Tums) chewable tab 1,000 mg  1,000 mg Oral Q4H PRN Rocio Phillips MD        zolpidem (Ambien) tab 5 mg  5 mg Oral Nightly PRN Rocio Phillips MD        oxyTOCIN in sodium chloride 0.9% (Pitocin) 30 Units/500mL infusion premix  0.5-20 rhona-units/min Intravenous Continuous Rocio Phillips MD 8 mL/hr at 07/16/24  1053 8 rhona-units/min at 07/16/24 1053    fentaNYL-bupivacaine 2 mcg/mL-0.125% in sodium chloride 0.9% 100 mL EPIDURAL infusion premix   Epidural Continuous Javad Perez MD        fentaNYL (Sublimaze) 50 mcg/mL injection 100 mcg  100 mcg Epidural Once Javad Perez MD        bupivacaine PF (Marcaine) 0.25% injection  20 mL Epidural Once Javad Perez MD        EPHEDrine (PF) 25 MG/5 ML injection 5 mg  5 mg Intravenous PRN Javad Perez MD        nalbuphine (Nubain) 10 mg/mL injection 2.5 mg  2.5 mg Intravenous Q15 Min PRN Javad Perez MD        lidocaine-EPINEPHrine (Xylocaine-Epinephrine) 2 %-[id number] injection              No current [facility]-ordered outpatient medications on file.       Allergies   Allergen Reactions    Sulfa Antibiotics ANAPHYLAXIS       Family History   Problem Relation Age of Onset    Diabetes Mother     Hypertension Mother     DVT/VTE Mother     Breast Cancer Mother 35        dx 35    Diabetes Father     Hypertension Father      Social History     Socioeconomic History    Marital status:    Tobacco Use    Smoking status: Never    Smokeless tobacco: Never   Vaping Use    Vaping status: Never Used   Substance and Sexual Activity    Alcohol use: Never    Drug use: Never    Sexual activity: Yes     Partners: Male       Available pre-op labs reviewed.  Lab Results   Component Value Date    WBC 10.0 07/16/2024    RBC 5.58 (H) 07/16/2024    HGB 12.0 07/16/2024    HCT 37.3 07/16/2024    MCV 66.8 (L) 07/16/2024    MCH 21.5 (L) 07/16/2024    MCHC 32.2 07/16/2024    RDW 15.2 (H) 07/16/2024    .0 07/16/2024             Vital Signs:  Body mass index is 30.21 kg/m².   height is 1.626 m (5' 4\") and weight is 79.8 kg (176 lb). Her oral temperature is 97.9 °F (36.6 °C). Her blood pressure is 131/87 and her pulse is 92. Her respiration is 20.   Vitals:    07/16/24 0545 07/16/24 0645   BP: 131/87    Pulse: 92    Resp: 20    Temp: 97.9 °F (36.6 °C)    TempSrc: Oral     Weight:  79.8 kg (176 lb)   Height:  1.626 m (5' 4\")        Anesthesia Evaluation     Patient summary reviewed and Nursing notes reviewed    Airway   Mallampati: I  Dental      Pulmonary - negative ROS   Cardiovascular   Exercise tolerance: good    NYHA Classification: I    Neuro/Psych - negative ROS     GI/Hepatic/Renal - negative ROS     Endo/Other - negative ROS   Abdominal                  Anesthesia Plan:   ASA:  2  Plan:   Epidural      I have informed Edelmira Willardky and/or legal guardian or family member of the nature of the anesthetic plan, benefits, risks including possible dental damage if relevant, major complications, and any alternative forms of anesthetic management.   All of the patient's questions were answered to the best of my ability. The patient desires the anesthetic management as planned.  AIDEE GARCIA MD  7/16/2024 11:28 AM  Present on Admission:  **None**

## 2024-07-16 NOTE — PROGRESS NOTES
Transferred Mother to room     89  via wheelchair, accompanied by S.O., in stable condition. Report given to  Radha ANDERSON. Bed in locked and low position, side rails up x 2, ID's checked and verified, call light with in reach, reinforced pt to call for assistance when needs  to go to the bathroom.

## 2024-07-16 NOTE — ANESTHESIA PROCEDURE NOTES
Labor Analgesia    Date/Time: 7/16/2024 11:29 AM    Performed by: Javad Perez MD  Authorized by: Javad Perez MD      General Information and Staff    Start Time:  7/16/2024 11:29 AM  End Time:  7/16/2024 11:29 AM  Anesthesiologist:  Javad Perez MD  Performed by:  Anesthesiologist  Patient Location:  OB  Reason for Block: labor epidural    Preanesthetic Checklist: patient identified, IV checked, risks and benefits discussed, monitors and equipment checked, pre-op evaluation, timeout performed, anesthesia consent and sterile technique used      Procedure Details    Patient Position:  Sitting  Prep: ChloraPrep    Monitoring:  Heart rate  Approach:  Midline    Epidural Needle    Injection Technique:  MARY air  Needle Type:  Tuohy  Needle Gauge:  18 G  Needle Length:  3.5 in  Location:  L2-3    Spinal Needle      Catheter    Catheter Type:  Multi-orifice  Catheter Size:  20 G  Test Dose:  Negative    Assessment      Additional Comments

## 2024-07-16 NOTE — PROGRESS NOTES
Pt is a 33 year old female admitted to LDR5/LDR5-A.     Chief Complaint   Patient presents with    Scheduled Induction     Hx: Pulmonary Embolism       Pt is  39w0d intra-uterine pregnancy.  History obtained, consents signed. Oriented to room, staff, and plan of care.

## 2024-07-16 NOTE — PLAN OF CARE
Problem: BIRTH - VAGINAL/ SECTION  Goal: Fetal and maternal status remain reassuring during the birth process  Description: INTERVENTIONS:  - Monitor vital signs  - Monitor fetal heart rate  - Monitor uterine activity  - Monitor labor progression (vaginal delivery)  - DVT prophylaxis (C/S delivery)  - Surgical antibiotic prophylaxis (C/S delivery)  Outcome: Progressing     Problem: PAIN - ADULT  Goal: Verbalizes/displays adequate comfort level or patient's stated pain goal  Description: INTERVENTIONS:  - Encourage pt to monitor pain and request assistance  - Assess pain using appropriate pain scale  - Administer analgesics based on type and severity of pain and evaluate response  - Implement non-pharmacological measures as appropriate and evaluate response  - Consider cultural and social influences on pain and pain management  - Manage/alleviate anxiety  - Utilize distraction and/or relaxation techniques  - Monitor for opioid side effects  - Notify MD/LIP if interventions unsuccessful or patient reports new pain  - Anticipate increased pain with activity and pre-medicate as appropriate  Outcome: Progressing     Problem: ANXIETY  Goal: Will report anxiety at manageable levels  Description: INTERVENTIONS:  - Administer medication as ordered  - Teach and rehearse alternative coping skills  - Provide emotional support with 1:1 interaction with staff  Outcome: Progressing     Problem: Patient Centered Care  Goal: Patient preferences are identified and integrated in the patient's plan of care  Description: Interventions:  - What would you like us to know as we care for you? \"It's a girl!\"  - Provide timely, complete, and accurate information to patient/family  - Incorporate patient and family knowledge, values, beliefs, and cultural backgrounds into the planning and delivery of care  - Encourage patient/family to participate in care and decision-making at the level they choose  - Honor patient and family  perspectives and choices  Outcome: Progressing     Problem: Patient/Family Goals  Goal: Patient/Family Long Term Goal  Description: Patient's Long Term Goal: Uncomplicated delivery    Interventions:  - Induction/augmentation per protocol  - education  - involve pt in plan of care  - monitoring labor progress and fhr per protocol  - frequent position changes  - See additional Care Plan goals for specific interventions    Outcome: Progressing  Goal: Patient/Family Short Term Goal  Description: Patient's Short Term Goal: comfort and pain control      Interventions:   - epidural per pt request and provider order  -iv/IM per pt request and provider order  -education  - involve pt in plan of care  -position for comfort  -nonpharmacologic intervention   - See additional Care Plan goals for specific interventions     Outcome: Progressing

## 2024-07-17 VITALS
OXYGEN SATURATION: 100 % | BODY MASS INDEX: 30.05 KG/M2 | RESPIRATION RATE: 18 BRPM | HEIGHT: 64 IN | TEMPERATURE: 98 F | WEIGHT: 176 LBS | SYSTOLIC BLOOD PRESSURE: 112 MMHG | DIASTOLIC BLOOD PRESSURE: 82 MMHG | HEART RATE: 96 BPM

## 2024-07-17 LAB
BASOPHILS # BLD AUTO: 0.07 X10(3) UL (ref 0–0.2)
BASOPHILS NFR BLD AUTO: 0.6 %
DEPRECATED RDW RBC AUTO: 37.1 FL (ref 35.1–46.3)
EOSINOPHIL # BLD AUTO: 0.13 X10(3) UL (ref 0–0.7)
EOSINOPHIL NFR BLD AUTO: 1 %
ERYTHROCYTE [DISTWIDTH] IN BLOOD BY AUTOMATED COUNT: 15.5 % (ref 11–15)
HCT VFR BLD AUTO: 34.7 %
HGB BLD-MCNC: 11 G/DL
IMM GRANULOCYTES # BLD AUTO: 0.33 X10(3) UL (ref 0–1)
IMM GRANULOCYTES NFR BLD: 2.6 %
LYMPHOCYTES # BLD AUTO: 2.74 X10(3) UL (ref 1–4)
LYMPHOCYTES NFR BLD AUTO: 22 %
MCH RBC QN AUTO: 21.7 PG (ref 26–34)
MCHC RBC AUTO-ENTMCNC: 31.7 G/DL (ref 31–37)
MCV RBC AUTO: 68.3 FL
MONOCYTES # BLD AUTO: 0.96 X10(3) UL (ref 0.1–1)
MONOCYTES NFR BLD AUTO: 7.7 %
NEUTROPHILS # BLD AUTO: 8.24 X10 (3) UL (ref 1.5–7.7)
NEUTROPHILS # BLD AUTO: 8.24 X10(3) UL (ref 1.5–7.7)
NEUTROPHILS NFR BLD AUTO: 66.1 %
PLATELET # BLD AUTO: 128 10(3)UL (ref 150–450)
PLATELETS.RETICULATED NFR BLD AUTO: 5.9 % (ref 0–7)
RBC # BLD AUTO: 5.08 X10(6)UL
T PALLIDUM AB SER QL IA: NONREACTIVE
WBC # BLD AUTO: 12.5 X10(3) UL (ref 4–11)

## 2024-07-17 PROCEDURE — 85025 COMPLETE CBC W/AUTO DIFF WBC: CPT | Performed by: OBSTETRICS & GYNECOLOGY

## 2024-07-17 RX ORDER — ACETAMINOPHEN 500 MG
500 TABLET ORAL EVERY 6 HOURS PRN
Qty: 60 TABLET | Refills: 0 | Status: SHIPPED | OUTPATIENT
Start: 2024-07-17

## 2024-07-17 NOTE — PLAN OF CARE
FOCUS: MOM DISCHARGE    D: DISCHARGE ORDER RECEIVED FROM MD    A: POSTPARTUM DISCHARGE FOLDER GIVEN, DISCHARGE MEDICATION FORM REVIEWED, SIGNED AND GIVEN TO PATIENT.  ID BAND MATCHED WITH INFANT BAND. PATIENT INFORMED WHEN TO MAKE FOLLOW UP APPOINTMENT WITH OB    R: MOTHER IS INTERACTING APPROPRIATELY WITH INFANT.  VERBALIZED UNDERSTANDING OF FOLLOW UP INSTRUCTIONS.  DISCHARGED IN STABLE VIE WHEELCHAIR.

## 2024-07-17 NOTE — DISCHARGE SUMMARY
[facility]  part of [facility]    Discharge Summary    Edelmira Richardson Patient Status:  Inpatient    1990 MRN [id number]   Location [facility] 3SE Attending Rocio Phillips MD   Hosp Day # 1 PCP Luis Hardy MD     Date of Admission: 2024    Admission Diagnoses: pregnancy  Pregnancy (HCC)    Date of Discharge: 24    Discharge Diagnoses:S/P Vaginal Delivery    Episode Diagnoses:   PREGNANCY Problems (from 24 to present)       No problems associated with this episode.                  Hospital Course:     EDC: Estimated Date of Delivery: 24    Gestational Age: 39w0d    Date of Delivery: 2024   Time of Delivery: 1:55 PM     Antepartum complications: none    Delivered By: GRACIELA PAREDES     Delivery Method: Normal spontaneous vaginal delivery     Delivery Procedures:     Baby: female       Apgars:  1 minute:   9                  5 minutes: 9                           10 minutes:      Feeding Method:The patient is currently breastfeeding.     Intrapartum Complications: None    Lacerations      Perineal lacerations: 2nd Repaired?: Yes     Vaginal laceration?: No      Cervical laceration?: No    Clitoral laceration?: No             Episiotomy: None     Placenta: Spontaneous     Postpartum complications: Anemia                  Discharge Plan:   Discharge Condition: Good    Discharge medications:  Current Discharge Medication List        New Orders    Details   acetaminophen 500 MG Oral Tab Take 1 tablet (500 mg total) by mouth every 6 (six) hours as needed.           Home Meds - Unchanged    Details   ferrous sulfate 325 (65 FE) MG Oral Tab EC Take 1 tablet (325 mg total) by mouth daily with breakfast.      enoxaparin (LOVENOX) 40 MG/0.4ML Subcutaneous Solution Inject 0.4 mL (40 mg total) into the skin daily.                   Discharge Diet: As tolerated    Discharge Activity: Pelvic rest until cleared    Follow up:     Follow Up in the Office: 6  weeks for postpartum visit     Follow-up Information       Laney Mendieta MD Follow up in 6 week(s).    Specialty: OBSTETRICS & GYNECOLOGY  Contact information:  [address] [place] [address]  [address]  [facility] [address]  [phone]                                     Laney Mendieta MD  7/17/2024

## 2024-07-17 NOTE — PLAN OF CARE
Problem: Patient Centered Care  Goal: Patient preferences are identified and integrated in the patient's plan of care  Description: Interventions:  - What would you like us to know as we care for you? \"It's a girl!\"  - Provide timely, complete, and accurate information to patient/family  - Incorporate patient and family knowledge, values, beliefs, and cultural backgrounds into the planning and delivery of care  - Encourage patient/family to participate in care and decision-making at the level they choose  - Honor patient and family perspectives and choices  7/16/2024 2337 by Shalonda David RN  Outcome: Progressing  7/16/2024 2337 by Shalonda David RN  Outcome: Progressing     Problem: Patient/Family Goals  Goal: Patient/Family Long Term Goal  Description: Patient's Long Term Goal: Uncomplicated delivery    Interventions:  - Induction/augmentation per protocol  - education  - involve pt in plan of care  - monitoring labor progress and fhr per protocol  - frequent position changes  - See additional Care Plan goals for specific interventions    7/16/2024 2337 by Shalonda David RN  Outcome: Progressing  7/16/2024 2337 by Shalonda David RN  Outcome: Progressing  Goal: Patient/Family Short Term Goal  Description: Patient's Short Term Goal: comfort and pain control      Interventions:   - epidural per pt request and provider order  -iv/IM per pt request and provider order  -education  - involve pt in plan of care  -position for comfort  -nonpharmacologic intervention   - See additional Care Plan goals for specific interventions     7/16/2024 2337 by Shalonda David RN  Outcome: Progressing  7/16/2024 2337 by Shalonda David RN  Outcome: Progressing     Problem: POSTPARTUM  Goal: Long Term Goal:Experiences normal postpartum course  Description: INTERVENTIONS:  - Assess and monitor vital signs and lab values.  - Assess fundus and lochia.  - Provide ice/sitz baths for perineum discomfort.  - Monitor healing of  incision/episiotomy/laceration, and assess for signs and symptoms of infection and hematoma.  - Assess bladder function and monitor for bladder distention.  - Provide/instruct/assist with pericare as needed.  - Provide VTE prophylaxis as needed.  - Monitor bowel function.  - Encourage ambulation and provide assistance as needed.  - Assess and monitor emotional status and provide social service/psych resources as needed.  - Utilize standard precautions and use personal protective equipment as indicated. Ensure aseptic care of all intravenous lines and invasive tubes/drains.  - Obtain immunization and exposure to communicable diseases history.  7/16/2024 2337 by Shalonda David, RN  Outcome: Progressing  7/16/2024 2337 by Shalonda David, RN  Outcome: Progressing  Goal: Optimize infant feeding at the breast  Description: INTERVENTIONS:  - Initiate breast feeding within first hour after birth.   - Monitor effectiveness of current breast feeding efforts.  - Assess support systems available to mother/family.  - Identify cultural beliefs/practices regarding lactation, letdown techniques, maternal food preferences.  - Assess mother's knowledge and previous experience with breast feeding.  - Provide information as needed about early infant feeding cues (e.g., rooting, lip smacking, sucking fingers/hand) versus late cue of crying.  - Discuss/demonstrate breast feeding aids (e.g., infant sling, nursing footstool/pillows, and breast pumps).  - Encourage mother/other family members to express feelings/concerns, and actively listen.  - Educate father/SO about benefits of breast feeding and how to manage common lactation challenges.  - Recommend avoidance of specific medications or substances incompatible with breast feeding.  - Assess and monitor for signs of nipple pain/trauma.  - Instruct and provide assistance with proper latch.  - Review techniques for milk expression (breast pumping) and storage of breast milk. Provide  pumping equipment/supplies, instructions and assistance, as needed.  - Encourage rooming-in and breast feeding on demand.  - Encourage skin-to-skin contact.  - Provide LC support as needed.  - Assess for and manage engorgement.  - Provide breast feeding education handouts and information on community breast feeding support.   2024 by Shalonda David RN  Outcome: Progressing  2024 by Shalonda David RN  Outcome: Progressing  Goal: Establishment of adequate milk supply with medication/procedure interruptions  Description: INTERVENTIONS:  - Review techniques for milk expression (breast pumping).   - Provide pumping equipment/supplies, instructions, and assistance until it is safe to breastfeed infant.  2024 by Shalonda David RN  Outcome: Progressing  2024 by Shalonda David RN  Outcome: Progressing  Goal: Experiences normal breast weaning course  Description: INTERVENTIONS:  - Assess for and manage engorgement.  - Instruct on breast care.  - Provide comfort measures.  Outcome: Progressing  Goal: Appropriate maternal -  bonding  Description: INTERVENTIONS:  - Assess caregiver- interactions.  - Assess caregiver's emotional status and coping mechanisms.  - Encourage caregiver to participate in  daily care.  - Assess support systems available to mother/family.  - Provide /case management support as needed.  2024 by Shalonda David RN  Outcome: Progressing  2024 by Shalonda David RN  Outcome: Progressing     Problem: GENITOURINARY - ADULT  Goal: Absence of urinary retention  Description: INTERVENTIONS:  - Assess patient’s ability to void and empty bladder  - Monitor intake/output and perform bladder scan as needed  - Follow urinary retention protocol/standard of care  - Consider collaborating with pharmacy to review patient's medication profile  - Implement strategies to promote bladder emptying  2024 by Frankie  MONICA Liz  Outcome: Progressing  7/16/2024 2337 by Shalonda David RN  Outcome: Progressing

## 2024-07-17 NOTE — DISCHARGE INSTRUCTIONS
Follow up with OB doctor in 6 weeks, or as directed by physician.  Pelvic rest for 6 weeks.  Call your OB doctor if you experience:    Heavy bleeding  Foul smelling discharge  Fever of 100.4 or above  Increased swelling  Severe headache and/or vision changes  Calf pain or tenderness  Changes in mood or depression   Nothing in vagina for 6 weeks

## 2024-08-16 ENCOUNTER — TELEPHONE (OUTPATIENT)
Dept: OBGYN UNIT | Facility: HOSPITAL | Age: 34
End: 2024-08-16

## 2024-08-16 NOTE — LACTATION NOTE
Cardiology Reviewed self and infant care with mom, she verbalizes understanding of instruction reviewed. Encouraged to follow up with MD's as directed with questions/concerns.

## 2024-08-21 ENCOUNTER — TELEPHONE (OUTPATIENT)
Dept: OBGYN UNIT | Facility: HOSPITAL | Age: 34
End: 2024-08-21

## 2024-08-22 ENCOUNTER — TELEPHONE (OUTPATIENT)
Dept: OBGYN UNIT | Facility: HOSPITAL | Age: 34
End: 2024-08-22

## (undated) NOTE — LETTER
Dear New MomHelene, we missed you! The nurses of [facility] have tried to reach you by phone to ask if you have any questions regarding your health or the health and care of your new little one.    We hope you are doing well. If, for any reason, you have questions or concerns about your health or your baby’s health, please contact your provider or your pediatrician or family medicine physician regarding your baby.     At [facility], we feel that postpartum support is very important for new families. Please see the enclosed new parent support flyer that lists support programs and resources with both in-person and online options.     Additionally, our Breastfeeding Centers at [facility] and [facility] in [place], offer outpatient visits with our International Board-Certified Lactation   Consultants (IBCLCs) for any breastfeeding concerns or questions you may have.    For issues related to stress, anxiety or depression, we have a Nurturing Mom support group that meets both in-person or online.  There’s also a 24-hour Mom’s Line where you can request a phone call from a clinical therapist for assistance for postpartum depression.    We encourage you to take advantage of these programs and resources as you recover from childbirth and learn to care for your new infant.    Best wishes,    [facility]            [id number]